# Patient Record
Sex: MALE | Race: WHITE | Employment: OTHER | RURAL
[De-identification: names, ages, dates, MRNs, and addresses within clinical notes are randomized per-mention and may not be internally consistent; named-entity substitution may affect disease eponyms.]

---

## 2021-02-08 ENCOUNTER — VIRTUAL VISIT (OUTPATIENT)
Dept: FAMILY MEDICINE CLINIC | Age: 51
End: 2021-02-08
Payer: COMMERCIAL

## 2021-02-08 DIAGNOSIS — L08.9 FOOT INFECTION: Primary | ICD-10-CM

## 2021-02-08 PROCEDURE — 99204 OFFICE O/P NEW MOD 45 MIN: CPT | Performed by: STUDENT IN AN ORGANIZED HEALTH CARE EDUCATION/TRAINING PROGRAM

## 2021-02-08 RX ORDER — SULFAMETHOXAZOLE AND TRIMETHOPRIM 800; 160 MG/1; MG/1
1 TABLET ORAL 2 TIMES DAILY
Qty: 14 TAB | Refills: 0 | Status: SHIPPED | OUTPATIENT
Start: 2021-02-08 | End: 2021-02-15

## 2021-02-08 NOTE — PROGRESS NOTES
Juan Rivera  44 y.o. male  1970  [address] [place] [address]  [phone]   [address]:    Telemedicine Progress Note  Judith Cloud MD       Encounter Date and Time: February 9, 2021 at 3:21 PM    Consent:  He and/or the health care decision maker is aware that that he may receive a bill for this telephone service, depending on his insurance coverage, and has provided verbal consent to proceed: YES    Chief Complaint   Patient presents with    Wound Infection     History of Present Illness   Juan Rivera is a 44 y.o. male was evaluated by synchronous (real-time) audio-video technology from home, through the [facility] Patient Portal.    Foot concern- 3 weeks ago, started getting blisters on both feet. Had been cleaning and using Neosporin on them. All but one cleared up and now last Wednesday the remaining spot became painful limiting ambulation, some swelling, redness and shooting pain. Now has black spots on it. Has been using epsom salt soaks. Currently has transitioned to wool socks and tennis shoes. SHx: Works for [employer]. 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 HENT: Negative for congestion and sore throat. Respiratory: Negative for cough and shortness of breath. Cardiovascular: Negative for chest pain and palpitations. Gastrointestinal: Negative for nausea and vomiting. Neurological: Negative for dizziness and headaches.        Vitals/Objective:     General: alert, cooperative, no distress   Mental  status: mental status: alert, oriented to person, place, and time, normal mood, behavior, speech, dress, motor activity, and thought processes   Resp: resp: normal effort and no respiratory distress   Neuro: neuro: no gross deficits   Skin: L 2nd toe with small blister and two focal dark area deep to the skin with appearance of small hematomas    Due to this being a TeleHealth evaluation, many elements of the physical examination are unable to be assessed. Assessment and Plan:   Time-based coding, delete if not needed: I spent at least 20 minutes with this new patient, and >50% of the time was spent counseling and/or coordinating care regarding foot wound    Time spent in direct conversation with the patient to include medical condition(s) discussed, assessment and treatment plan:    1. Foot infection- Blister with small resolving hematoma underneath. However given erythema and notable pain will cover empirically for infection. No history of diabetes or PVD. Verbally reviewed labs checked by [facility] for these conditions. Follow up by the end of the week to monitor symptoms and possible in person visit. - trimethoprim-sulfamethoxazole (BACTRIM DS, SEPTRA DS) 160-800 mg per tablet; Take 1 Tab by mouth two (2) times a day for 7 days. Dispense: 14 Tab; Refill: 0        We discussed the expected course, resolution and complications of the diagnosis(es) in detail. Medication risks, benefits, costs, interactions, and alternatives were discussed as indicated. I advised him to contact the office if his condition worsens, changes or fails to improve as anticipated. He expressed understanding with the diagnosis(es) and plan. Patient understands that this encounter was a temporary measure, and the importance of further follow up and examination was emphasized. Patient verbalized understanding. Patient informed to follow up: 3-5 days    Electronically Signed: Omar Art MD    Providers location when delivering service (clinic, hospital, home): Home    CPT Codes 36821-19074 for Established Patients may apply to this Telehealth Visit. POS code: 18.   Modifier GT      Pursuant to the emergency declaration under the [address] [facility], 1135 waiver authority and the [employer] Supplemental Appropriations Act, this Virtual  Visit was conducted, with patient's consent, to reduce the patient's risk of exposure to COVID-19 and provide continuity of care for an established patient. Services were provided through a video synchronous discussion virtually to substitute for in-person clinic visit. History   Patients past medical, surgical and family histories were reviewed and updated.       Past Medical History:   Diagnosis Date    Anxiety     Arthritis     Jo's esophagus     Cough     Depression     Falls     Fatigue     GERD (gastroesophageal reflux disease)     Headache     Hypertension     IBS (irritable bowel syndrome)     N&V (nausea and vomiting)     PTSD (post-traumatic stress disorder)     Rash     Ringing in ears     Snoring     Vertigo     Visual disturbance      Past Surgical History:   Procedure Laterality Date    ABDOMEN SURGERY PROC UNLISTED      HX HEENT       Family History   Problem Relation Age of Onset    Headache Other     MS Other     Stroke Other      Social History     Socioeconomic History    Marital status:      Spouse name: Not on file    Number of children: Not on file    Years of education: Not on file    Highest education level: Not on file   Occupational History    Not on file   Social Needs    Financial resource strain: Not on file    Food insecurity     Worry: Not on file     Inability: Not on file    Transportation needs     Medical: Not on file     Non-medical: Not on file   Tobacco Use    Smoking status: Never Smoker    Smokeless tobacco: Current User   Substance and Sexual Activity    Alcohol use: Yes     Comment: very little    Drug use: No    Sexual activity: Not on file   Lifestyle    Physical activity     Days per week: Not on file     Minutes per session: Not on file    Stress: Not on file   Relationships    Social connections     Talks on phone: Not on file     Gets together: Not on file     Attends Synagogue service: Not on file     Active member of club or organization: Not on file     Attends meetings of clubs or organizations: Not on file     Relationship status: Not on file    Intimate partner violence     Fear of current or ex partner: Not on file     Emotionally abused: Not on file     Physically abused: Not on file     Forced sexual activity: Not on file   Other Topics Concern    Not on file   Social History Narrative    Not on file     Patient Active Problem List   Diagnosis Code    Dizziness and giddiness R42    Visual disturbance H53.9    Memory loss R41.3    B12 deficiency E53.8    Imbalance R26.89    PTSD (post-traumatic stress disorder) F43.10    IBS (irritable bowel syndrome) K58.9    Chronic fatigue R53.82    Vitamin D deficiency E55.9    Pain Disorder Associated With Both Psychological Factors And A History Of A General Medical Condition F45.42    Posttraumatic stress disorder F43.10    Depressive disorder, not elsewhere classified F32.9    Anxiety state, unspecified F41.1          Current Medications/Allergies   Medications and Allergies reviewed:    Current Outpatient Medications   Medication Sig Dispense Refill    trimethoprim-sulfamethoxazole (BACTRIM DS, SEPTRA DS) 160-800 mg per tablet Take 1 Tab by mouth two (2) times a day for 7 days. 14 Tab 0    celecoxib (CELEBREX) 200 mg capsule Take 1 Cap by mouth two (2) times a day. 60 Cap 5    traMADol (ULTRAM) 50 mg tablet Take 1 Tab by mouth two (2) times a day. Max Daily Amount: 100 mg. 30 Tab 0    amitriptyline (ELAVIL) 25 mg tablet Take 1 Tab by mouth nightly. 30 Tab 6    cyclobenzaprine (FLEXERIL) 10 mg tablet Take 1 Tab by mouth three (3) times daily as needed for Muscle Spasm(s). 270 Tab 3    cholecalciferol, vitamin D3, (VITAMIN D3) 2,000 unit tab Take 4,000 mg by mouth.  SUCRALFATE (CARAFATE PO) Take  by mouth three (3) times daily.  esomeprazole (NEXIUM) 40 mg capsule Take  by mouth daily.       Omega-3-DHA-EPA-Fish Oil 1,000 (120-180) mg cap Take  by mouth two (2) times a day.        Allergies   Allergen Reactions    Codeine Rash

## 2023-02-21 ENCOUNTER — OFFICE VISIT (OUTPATIENT)
Dept: FAMILY MEDICINE CLINIC | Age: 53
End: 2023-02-21
Payer: COMMERCIAL

## 2023-02-21 VITALS
HEART RATE: 82 BPM | OXYGEN SATURATION: 98 % | SYSTOLIC BLOOD PRESSURE: 126 MMHG | TEMPERATURE: 97.4 F | DIASTOLIC BLOOD PRESSURE: 71 MMHG | WEIGHT: 203 LBS | BODY MASS INDEX: 27.5 KG/M2 | HEIGHT: 72 IN | RESPIRATION RATE: 18 BRPM

## 2023-02-21 DIAGNOSIS — Z00.00 ENCOUNTER FOR MEDICAL EXAMINATION TO ESTABLISH CARE: Primary | ICD-10-CM

## 2023-02-21 DIAGNOSIS — M25.512 LEFT SHOULDER PAIN, UNSPECIFIED CHRONICITY: ICD-10-CM

## 2023-02-21 PROCEDURE — 99213 OFFICE O/P EST LOW 20 MIN: CPT | Performed by: STUDENT IN AN ORGANIZED HEALTH CARE EDUCATION/TRAINING PROGRAM

## 2023-02-21 RX ORDER — BACLOFEN 20 MG
TABLET ORAL DAILY
COMMUNITY

## 2023-02-21 RX ORDER — DULOXETIN HYDROCHLORIDE 60 MG/1
60 CAPSULE, DELAYED RELEASE ORAL 2 TIMES DAILY
COMMUNITY

## 2023-02-21 RX ORDER — PREGABALIN 150 MG/1
150 CAPSULE ORAL 2 TIMES DAILY
COMMUNITY

## 2023-02-21 RX ORDER — RIZATRIPTAN BENZOATE 10 MG/1
10 TABLET ORAL
COMMUNITY

## 2023-02-21 RX ORDER — TRAZODONE HYDROCHLORIDE 50 MG/1
50 TABLET ORAL
COMMUNITY

## 2023-02-21 RX ORDER — ATORVASTATIN CALCIUM 80 MG/1
40 TABLET, FILM COATED ORAL DAILY
COMMUNITY

## 2023-02-21 RX ORDER — FAMOTIDINE 20 MG/1
20 TABLET, FILM COATED ORAL 2 TIMES DAILY
COMMUNITY

## 2023-02-21 RX ORDER — PANTOPRAZOLE SODIUM 40 MG/1
40 TABLET, DELAYED RELEASE ORAL DAILY
COMMUNITY

## 2023-02-21 RX ORDER — PRAZOSIN HYDROCHLORIDE 2 MG/1
2 CAPSULE ORAL
COMMUNITY

## 2023-02-21 RX ORDER — DICLOFENAC SODIUM 10 MG/G
2 GEL TOPICAL 4 TIMES DAILY
COMMUNITY

## 2023-02-21 RX ORDER — ONDANSETRON HYDROCHLORIDE 8 MG/1
8 TABLET, FILM COATED ORAL
COMMUNITY

## 2023-02-21 NOTE — PROGRESS NOTES
[address] [facility] [address] [facility], [facility] [address]  P ([phone])  Date of visit:  2/21/2023    Chris Gomez is a  45 y.o. male that presents to the clinic to establish care. Followed up with the [place] previously. Follows up with neurology, urology. Retired from [employer] in 2014. Follows up with urology regarding hematuria, will be doing cystoscopy, and renal US for renal cyst. He has been worked up for [address] and all kind of things in the past. He was having symptoms of visual issue, weakness in left/right side of the body, migraine, fatigue. He has medical history of fibromyalgia, PTSD, Migraine. He is on medication to help with all. On tramadol, gets it refilled at the [place]. Left shoulder pain, ongoing for about a month. It is the side were his wife lays on. He is on tramadol. Allergies   Allergies   Allergen Reactions    Codeine Rash       Medications  Current Outpatient Medications   Medication Sig    atorvastatin (LIPITOR) 80 mg tablet Take 40 mg by mouth daily. diclofenac (VOLTAREN) 1 % gel Apply 2 g to affected area four (4) times daily. DULoxetine (CYMBALTA) 60 mg capsule Take 60 mg by mouth two (2) times a day. famotidine (PEPCID) 20 mg tablet Take 20 mg by mouth two (2) times a day.    galcanezumab-gnlm (EMGALITY) 120 mg/mL injection 120 mg by SubCUTAneous route every thirty (30) days. ondansetron hcl (Zofran) 8 mg tablet Take 8 mg by mouth every eight (8) hours as needed for Nausea or Vomiting. pantoprazole (PROTONIX) 40 mg tablet Take 40 mg by mouth daily. pregabalin (Lyrica) 150 mg capsule Take 150 mg by mouth two (2) times a day. traZODone (DESYREL) 50 mg tablet Take 50 mg by mouth nightly. rizatriptan (MAXALT) 10 mg tablet Take 10 mg by mouth once as needed for Migraine. May repeat in 2 hours if needed    ferrous fumarate/vit Bcomp,C (SUPER B COMPLEX PO) Take  by mouth daily. magnesium oxide 500 mg tab Take  by mouth daily. prazosin (MINIPRESS) 2 mg capsule Take 2 mg by mouth nightly. As needed    traMADol (ULTRAM) 50 mg tablet Take 1 Tab by mouth two (2) times a day. Max Daily Amount: 100 mg. (Patient taking differently: Take 100 mg by mouth every eight (8) hours.)    Cholecalciferol, Vitamin D3, 25 mcg (1,000 unit) chew Take 3,000 Units by mouth daily. Omega-3-DHA-EPA-Fish Oil 1,000 (120-180) mg cap Take  by mouth two (2) times a day. celecoxib (CELEBREX) 200 mg capsule Take 1 Cap by mouth two (2) times a day. (Patient not taking: Reported on 2/21/2023)    amitriptyline (ELAVIL) 25 mg tablet Take 1 Tab by mouth nightly. (Patient not taking: Reported on 2/21/2023)    cyclobenzaprine (FLEXERIL) 10 mg tablet Take 1 Tab by mouth three (3) times daily as needed for Muscle Spasm(s). (Patient not taking: Reported on 2/21/2023)    SUCRALFATE (CARAFATE PO) Take  by mouth three (3) times daily. esomeprazole (NEXIUM) 40 mg capsule Take  by mouth daily. (Patient not taking: Reported on 2/21/2023)     No current facility-administered medications for this visit.        Medical History  Past Medical History:   Diagnosis Date    Anxiety     Arthritis     Jo's esophagus     Cough     Depression     Falls     Fatigue     GERD (gastroesophageal reflux disease)     Headache     Hypertension     IBS (irritable bowel syndrome)     N&V (nausea and vomiting)     PTSD (post-traumatic stress disorder)     Rash     Ringing in ears     Snoring     Vertigo     Visual disturbance        Immunizations   Immunization History   Administered Date(s) Administered    Influenza, FLUARIX, FLULAVAL, FLUZONE (age 10 mo+) AND AFLURIA, (age 1 y+), PF, 0.5mL 01/21/2015       Social History  Social History     Tobacco Use    Smoking status: Never    Smokeless tobacco: Current   Substance Use Topics    Alcohol use: Yes     Comment: very little    Drug use: No       Objective   Visit Vitals  /71   Pulse 82   Temp 97.4 °F (36.3 °C) (Oral)   Resp 18   Ht 6' (1.829 m)   Wt 203 lb (92.1 kg)   SpO2 98%   BMI 27.53 kg/m²       Physical Exam  Constitutional:       General: He is not in acute distress. Appearance: Normal appearance. He is not ill-appearing, toxic-appearing or diaphoretic. Cardiovascular:      Rate and Rhythm: Normal rate and regular rhythm. Pulmonary:      Effort: Pulmonary effort is normal. No respiratory distress. Breath sounds: Normal breath sounds. No wheezing. Musculoskeletal:         General: No swelling or deformity. Normal range of motion. Comments: Good range of motion with left and right shoulder. Lift off sign is negative. siddiqi test is negative. Tenderness on the anterior aspect of left upper arm. Neurological:      Mental Status: He is alert. Doris Briceño is a 45 y. o. who presents to the clinic to establish care. Plan     1. Encounter for medical examination to establish care  - Obtain medical records from previous provider ([facility] [place], Urologist and Neurologist). - Follow up in 2-4 weeks to review medical records, to determine lab work or further imaging that needs to be ordered. - Following up with urology for hematuria and kidney cyst.  - Following up with Neurology for migraine. 2. Left shoulder pain, unspecified chronicity: ongoing for a month. Feels like the pain is not with his muscle, but inside. Patient has good range of motion. Low suspicion for impingement. Mickie Wong is negative. May be due to muscle strain. - XR SHOULDER LT AP/LAT MIN 2 V; Future      Follow-up and Dispositions    Return for Follow up in  2- 4 weeks for follow up. I have discussed the aforementioned diagnoses and plan with the patient in detail. I have provided information in person and/or in AVS. All questions answered prior to discharge.     Signed By:  Tiff Lynne MD    Family Medicine Resident

## 2023-02-21 NOTE — PROGRESS NOTES
1. \"Have you been to the ER, urgent care clinic since your last visit? Hospitalized since your last visit? \" No    2. \"Have you seen or consulted any other health care providers outside of the [address] [place] [address] since your last visit? \" No     3. For patients aged 39-70: Has the patient had a colonoscopy / FIT/ Cologuard? Yes - Care Gap present. Rooming MA/LPN to request most recent results      If the patient is female:    4. For patients aged 41-77: Has the patient had a mammogram within the past 2 years? NA - based on age or sex      11. For patients aged 21-65: Has the patient had a pap smear?  NA - based on age or sex    Health Maintenance Due   Topic Date Due    Hepatitis C Screening  Never done    COVID-19 Vaccine (1) Never done    Depression Monitoring  Never done    DTaP/Tdap/Td series (1 - Tdap) Never done    Colorectal Cancer Screening Combo  Never done    Lipid Screen  02/25/2016    Shingles Vaccine (1 of 2) Never done    Flu Vaccine (1) 08/01/2022

## 2023-02-28 ENCOUNTER — PATIENT MESSAGE (OUTPATIENT)
Dept: FAMILY MEDICINE CLINIC | Age: 53
End: 2023-02-28

## 2023-02-28 ENCOUNTER — TELEPHONE (OUTPATIENT)
Dept: FAMILY MEDICINE CLINIC | Age: 53
End: 2023-02-28

## 2023-02-28 DIAGNOSIS — R31.9 HEMATURIA, UNSPECIFIED TYPE: Primary | ICD-10-CM

## 2023-02-28 NOTE — TELEPHONE ENCOUNTER
Pt's wife wrote in requesting pt get a referral to [address] [place] [address] Urology for hematuria. He has been following with the [address] [place] [address] for this, but they are not able to see him for several months. Referral placed and [employer] message sent with referral information.

## 2023-03-01 ENCOUNTER — PATIENT MESSAGE (OUTPATIENT)
Dept: FAMILY MEDICINE CLINIC | Age: 53
End: 2023-03-01

## 2023-03-30 ENCOUNTER — OFFICE VISIT (OUTPATIENT)
Dept: FAMILY MEDICINE CLINIC | Age: 53
End: 2023-03-30
Payer: COMMERCIAL

## 2023-03-30 VITALS
WEIGHT: 203 LBS | HEART RATE: 64 BPM | RESPIRATION RATE: 18 BRPM | DIASTOLIC BLOOD PRESSURE: 69 MMHG | BODY MASS INDEX: 27.5 KG/M2 | OXYGEN SATURATION: 98 % | HEIGHT: 72 IN | TEMPERATURE: 98.2 F | SYSTOLIC BLOOD PRESSURE: 135 MMHG

## 2023-03-30 DIAGNOSIS — R07.81 RIB PAIN ON RIGHT SIDE: Primary | ICD-10-CM

## 2023-03-30 PROCEDURE — 99213 OFFICE O/P EST LOW 20 MIN: CPT | Performed by: FAMILY MEDICINE

## 2023-03-30 RX ORDER — CHOLECALCIFEROL (VITAMIN D3) 125 MCG
5 CAPSULE ORAL
COMMUNITY

## 2023-03-30 RX ORDER — CYCLOBENZAPRINE HCL 5 MG
5 TABLET ORAL
Qty: 10 TABLET | Refills: 0 | Status: SHIPPED | OUTPATIENT
Start: 2023-03-30

## 2023-03-30 NOTE — PROGRESS NOTES
[facility]    History of Present Illness:   Anjelica Cast is a 45 y.o. male here for   Chief Complaint   Patient presents with    Rib Pain     Right side x1.5 weeks  Fall          HPI:  Wanda Leaver  a week and a half ago off of a 3 foot loading dock. He was holding a speaker when he fell. This speaker was pushed into his right lower anterior ribs. No bruising. Some sob initially. No sob now. 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 He says the pain is actually worse now. Pain 7/10 at its worst but it sitting at rest it is 5/10. Pain with reaching, getting up from chair. No tylenol. He takes tramadol and Lyrica which she gets through the [place]. He used to take Flexeril but is not on that currently. He has not tried Voltaren gel yet either. Health Maintenance  Health Maintenance Due   Topic Date Due    Hepatitis C Screening  Never done    COVID-19 Vaccine (1) Never done    DTaP/Tdap/Td series (1 - Tdap) Never done    Colorectal Cancer Screening Combo  Never done    Lipid Screen  02/25/2016    Shingles Vaccine (1 of 2) Never done    Flu Vaccine (1) 08/01/2022       Past Medical, Family, and Social History:     Past Medical History:   Diagnosis Date    Anxiety     Arthritis     Jo's esophagus     Cough     Depression     Falls     Fatigue     GERD (gastroesophageal reflux disease)     Headache     Hypertension     IBS (irritable bowel syndrome)     N&V (nausea and vomiting)     PTSD (post-traumatic stress disorder)     Rash     Ringing in ears     Snoring     Vertigo     Visual disturbance       Past Surgical History:   Procedure Laterality Date    HX HEENT      CT UNLISTED PROCEDURE ABDOMEN PERITONEUM & OMENTUM         Current Outpatient Medications on File Prior to Visit   Medication Sig Dispense Refill    melatonin 5 mg tablet Take 5 mg by mouth nightly. atorvastatin (LIPITOR) 80 mg tablet Take 40 mg by mouth daily.       diclofenac (VOLTAREN) 1 % gel Apply 2 g to affected area four (4) times daily. DULoxetine (CYMBALTA) 60 mg capsule Take 60 mg by mouth two (2) times a day. famotidine (PEPCID) 20 mg tablet Take 20 mg by mouth two (2) times a day.      galcanezumab-gnlm (EMGALITY) 120 mg/mL injection 120 mg by SubCUTAneous route every thirty (30) days. ondansetron hcl (ZOFRAN) 8 mg tablet Take 8 mg by mouth every eight (8) hours as needed for Nausea or Vomiting. pantoprazole (PROTONIX) 40 mg tablet Take 40 mg by mouth daily. pregabalin (LYRICA) 150 mg capsule Take 150 mg by mouth two (2) times a day. traZODone (DESYREL) 50 mg tablet Take 50 mg by mouth nightly. rizatriptan (MAXALT) 10 mg tablet Take 10 mg by mouth once as needed for Migraine. May repeat in 2 hours if needed      ferrous fumarate/vit Bcomp,C (SUPER B COMPLEX PO) Take  by mouth daily. magnesium oxide 500 mg tab Take  by mouth daily. prazosin (MINIPRESS) 2 mg capsule Take 2 mg by mouth nightly. As needed      traMADol (ULTRAM) 50 mg tablet Take 1 Tab by mouth two (2) times a day. Max Daily Amount: 100 mg. (Patient taking differently: Take 100 mg by mouth every eight (8) hours. ) 30 Tab 0    Cholecalciferol, Vitamin D3, 25 mcg (1,000 unit) chew Take 3,000 Units by mouth daily. Omega-3-DHA-EPA-Fish Oil 1,000 (120-180) mg cap Take  by mouth two (2) times a day. celecoxib (CELEBREX) 200 mg capsule Take 1 Cap by mouth two (2) times a day. (Patient not taking: No sig reported) 60 Cap 5    amitriptyline (ELAVIL) 25 mg tablet Take 1 Tab by mouth nightly. (Patient not taking: No sig reported) 30 Tab 6    cyclobenzaprine (FLEXERIL) 10 mg tablet Take 1 Tab by mouth three (3) times daily as needed for Muscle Spasm(s). (Patient not taking: No sig reported) 270 Tab 3    SUCRALFATE (CARAFATE PO) Take  by mouth three (3) times daily. (Patient not taking: Reported on 3/30/2023)      esomeprazole (NEXIUM) 40 mg capsule Take  by mouth daily.  (Patient not taking: No sig reported)       No current facility-administered medications on file prior to visit. Patient Active Problem List   Diagnosis Code    Dizziness and giddiness R42    Visual disturbance H53.9    Memory loss R41.3    B12 deficiency E53.8    Imbalance R26.89    PTSD (post-traumatic stress disorder) F43.10    IBS (irritable bowel syndrome) K58.9    Chronic fatigue R53.82    Vitamin D deficiency E55.9    Pain Disorder Associated With Both Psychological Factors And A History Of A General Medical Condition F45.42    Posttraumatic stress disorder F43.10    Depressive disorder, not elsewhere classified F32.89    Anxiety state, unspecified F41.1       Social History     Socioeconomic History    Marital status:    Tobacco Use    Smoking status: Never    Smokeless tobacco: Current   Substance and Sexual Activity    Alcohol use: Yes     Comment: very little    Drug use: No     Social Determinants of Health     Financial Resource Strain: Low Risk     Difficulty of Paying Living Expenses: Not hard at all   Food Insecurity: No Food Insecurity    Worried About Running Out of Food in the Last Year: Never true    Ran Out of Food in the Last Year: Never true        Review of Systems   Review of Systems   Constitutional:  Negative for chills and fever. Respiratory:  Negative for cough and shortness of breath. Cardiovascular:  Negative for chest pain.      Objective:   Visit Vitals  /69 (BP 1 Location: Left upper arm, BP Patient Position: Sitting, BP Cuff Size: Large adult)   Pulse 64   Temp 98.2 °F (36.8 °C) (Oral)   Resp 18   Ht 6' (1.829 m)   Wt 203 lb (92.1 kg)   SpO2 98%   BMI 27.53 kg/m²        Physical Exam  PHYSICAL EXAM:  Gen: Pt sitting in chair, in NAD  Head: Normocephalic, atraumatic  Eyes: Sclera anicteric, EOM grossly intact,  CVS: Normal S1, S2, no m/r/g  Resp: CTAB, no rhonchi, wheezes or rales   Chest wall: Tender to palpation on the right anterior/lateral rib cage  Abd: Soft, non-tender, non-distended, +BS  Neuro: Alert, oriented, appropriate        Assessment and orders:       ICD-10-CM ICD-9-CM    1. Rib pain on right side  R07.81 786.50 XR RIBS RT UNI 2 V      cyclobenzaprine (FLEXERIL) 5 mg tablet        Diagnoses and all orders for this visit:    1. Rib pain on right side  -     XR RIBS RT UNI 2 V; Future  -     cyclobenzaprine (FLEXERIL) 5 mg tablet; Take 1 Tablet by mouth nightly. Advised otc tylenol arthritis 2 tabs every 12 hr, topical diclofenac, lidocaine patch 12 hr on and 12 hr off. Use Flexeril sparingly and do not take with either tramadol or Lyrica due to risk of seizures, sedation and even coma or death. Patient verbalized understanding and agreed with plan. F/U ASAP for worsening pain or swelling or decreased ROM. radiology results and schedule of future radiology studies reviewed with patient    I have discussed the diagnosis with the patient and the intended plan as seen in the above orders. Social history, medical history, and labs were reviewed. The patient has received an after-visit summary and questions were answered concerning future plans. I have discussed medication side effects and warnings with the patient as well. Patient/guardian verbalized understanding and accepts plan & risks. Please note that this dictation was completed with [employer], the computer voice recognition software. Quite often unanticipated grammatical, syntax, homophones, and other interpretive errors are inadvertently transcribed by the computer software. Please disregard these errors. Please excuse any errors that have escaped final proofreading. Thank you.      MD LIAT Teixeira [facility]  03/30/23

## 2023-03-30 NOTE — PROGRESS NOTES
1. \"Have you been to the ER, urgent care clinic since your last visit? Hospitalized since your last visit? \" No    2. \"Have you seen or consulted any other health care providers outside of the [address] [place] [address] since your last visit? \" No     3. For patients aged 39-70: Has the patient had a colonoscopy / FIT/ Cologuard? Yes - Care Gap present. Rooming MA/LPN to request most recent results      If the patient is female:    4. For patients aged 41-77: Has the patient had a mammogram within the past 2 years? NA - based on age or sex      11. For patients aged 21-65: Has the patient had a pap smear?  NA - based on age or sex    Health Maintenance Due   Topic Date Due    Hepatitis C Screening  Never done    COVID-19 Vaccine (1) Never done    DTaP/Tdap/Td series (1 - Tdap) Never done    Colorectal Cancer Screening Combo  Never done    Lipid Screen  02/25/2016    Shingles Vaccine (1 of 2) Never done    Flu Vaccine (1) 08/01/2022

## 2023-05-22 RX ORDER — FAMOTIDINE 20 MG/1
20 TABLET, FILM COATED ORAL 2 TIMES DAILY
COMMUNITY

## 2023-05-22 RX ORDER — PRAZOSIN HYDROCHLORIDE 2 MG/1
2 CAPSULE ORAL
COMMUNITY

## 2023-05-22 RX ORDER — BACLOFEN 20 MG
TABLET ORAL DAILY
COMMUNITY

## 2023-05-22 RX ORDER — CHOLECALCIFEROL (VITAMIN D3) 125 MCG
5 CAPSULE ORAL NIGHTLY
COMMUNITY

## 2023-05-22 RX ORDER — PREGABALIN 150 MG/1
150 CAPSULE ORAL 2 TIMES DAILY
COMMUNITY

## 2023-05-22 RX ORDER — PANTOPRAZOLE SODIUM 40 MG/1
40 TABLET, DELAYED RELEASE ORAL DAILY
COMMUNITY

## 2023-05-22 RX ORDER — TRAZODONE HYDROCHLORIDE 50 MG/1
50 TABLET ORAL NIGHTLY
COMMUNITY

## 2023-05-22 RX ORDER — ATORVASTATIN CALCIUM 80 MG/1
40 TABLET, FILM COATED ORAL DAILY
COMMUNITY

## 2023-05-22 RX ORDER — RIZATRIPTAN BENZOATE 10 MG/1
10 TABLET ORAL
COMMUNITY

## 2023-05-22 RX ORDER — ONDANSETRON HYDROCHLORIDE 8 MG/1
8 TABLET, FILM COATED ORAL EVERY 8 HOURS PRN
COMMUNITY

## 2023-05-22 RX ORDER — TRAMADOL HYDROCHLORIDE 50 MG/1
50 TABLET ORAL 2 TIMES DAILY
COMMUNITY
Start: 2015-03-10

## 2023-05-22 RX ORDER — DULOXETIN HYDROCHLORIDE 60 MG/1
60 CAPSULE, DELAYED RELEASE ORAL 2 TIMES DAILY
COMMUNITY

## 2023-05-22 RX ORDER — CYCLOBENZAPRINE HCL 5 MG
1 TABLET ORAL NIGHTLY
COMMUNITY
Start: 2023-03-30

## 2023-05-22 RX ORDER — NAPROXEN 500 MG/1
500 TABLET ORAL 2 TIMES DAILY PRN
COMMUNITY
Start: 2023-04-14

## 2024-02-26 LAB
CHOLESTEROL, TOTAL: 152 MG/DL
CHOLESTEROL/HDL RATIO: NORMAL
ESTIMATED AVERAGE GLUCOSE: NORMAL
HBA1C MFR BLD: 5.9 %
HDLC SERPL-MCNC: 43 MG/DL (ref 35–70)
LDL CHOLESTEROL CALCULATED: 71.4 MG/DL (ref 0–160)
NONHDLC SERPL-MCNC: NORMAL MG/DL
TRIGL SERPL-MCNC: 188 MG/DL
VLDLC SERPL CALC-MCNC: NORMAL MG/DL

## 2024-05-21 ENCOUNTER — OFFICE VISIT (OUTPATIENT)
Facility: CLINIC | Age: 54
End: 2024-05-21
Payer: OTHER GOVERNMENT

## 2024-05-21 VITALS
RESPIRATION RATE: 16 BRPM | WEIGHT: 204.6 LBS | HEART RATE: 61 BPM | SYSTOLIC BLOOD PRESSURE: 128 MMHG | HEIGHT: 72 IN | BODY MASS INDEX: 27.71 KG/M2 | DIASTOLIC BLOOD PRESSURE: 73 MMHG | OXYGEN SATURATION: 97 % | TEMPERATURE: 98.1 F

## 2024-05-21 DIAGNOSIS — F51.5 NIGHTMARE: ICD-10-CM

## 2024-05-21 DIAGNOSIS — F33.2 SEVERE EPISODE OF RECURRENT MAJOR DEPRESSIVE DISORDER, WITHOUT PSYCHOTIC FEATURES (HCC): ICD-10-CM

## 2024-05-21 DIAGNOSIS — F43.10 PTSD (POST-TRAUMATIC STRESS DISORDER): ICD-10-CM

## 2024-05-21 DIAGNOSIS — R31.9 HEMATURIA, UNSPECIFIED TYPE: Primary | ICD-10-CM

## 2024-05-21 PROBLEM — N28.1 SIMPLE RENAL CYST: Status: ACTIVE | Noted: 2024-05-21

## 2024-05-21 PROBLEM — E29.1 HYPOGONADISM IN MALE: Status: ACTIVE | Noted: 2024-05-21

## 2024-05-21 PROBLEM — F43.12 CHRONIC POST-TRAUMATIC STRESS DISORDER (PTSD) AFTER MILITARY COMBAT: Status: ACTIVE | Noted: 2024-05-21

## 2024-05-21 PROBLEM — M79.7 PRIMARY FIBROMYALGIA SYNDROME: Status: ACTIVE | Noted: 2024-05-21

## 2024-05-21 PROBLEM — M54.50 CHRONIC LOW BACK PAIN: Status: ACTIVE | Noted: 2024-05-21

## 2024-05-21 PROBLEM — M47.816 SPONDYLOSIS OF LUMBAR SPINE: Status: ACTIVE | Noted: 2024-05-21

## 2024-05-21 PROBLEM — M15.9 GENERALIZED OSTEOARTHRITIS: Status: ACTIVE | Noted: 2024-05-21

## 2024-05-21 PROBLEM — M46.1 SACROILIITIS (HCC): Status: RESOLVED | Noted: 2024-05-21 | Resolved: 2024-05-21

## 2024-05-21 PROBLEM — M17.9 OSTEOARTHRITIS OF KNEE: Status: ACTIVE | Noted: 2024-05-21

## 2024-05-21 PROBLEM — Z77.29 EXPOSURE TO POTENTIALLY HAZARDOUS SUBSTANCE: Status: ACTIVE | Noted: 2024-05-21

## 2024-05-21 PROBLEM — G43.719 CHRONIC MIGRAINE WITHOUT AURA, INTRACTABLE, WITHOUT STATUS MIGRAINOSUS: Status: ACTIVE | Noted: 2024-05-21

## 2024-05-21 PROBLEM — R73.03 PREDIABETES: Status: ACTIVE | Noted: 2024-05-21

## 2024-05-21 PROBLEM — E78.1 HYPERTRIGLYCERIDEMIA: Status: ACTIVE | Noted: 2024-05-21

## 2024-05-21 PROBLEM — M25.50 ARTHRALGIA OF MULTIPLE JOINTS: Status: ACTIVE | Noted: 2024-05-21

## 2024-05-21 PROBLEM — R25.1 TREMOR, UNSPECIFIED: Status: ACTIVE | Noted: 2024-05-21

## 2024-05-21 PROBLEM — M47.819 ARTHRITIS OF SPINE: Status: ACTIVE | Noted: 2024-05-21

## 2024-05-21 PROBLEM — S06.9X9A TRAUMATIC BRAIN INJURY WITH LOSS OF CONSCIOUSNESS (HCC): Status: ACTIVE | Noted: 2024-05-21

## 2024-05-21 PROBLEM — H40.009 BORDERLINE GLAUCOMA: Status: ACTIVE | Noted: 2024-05-21

## 2024-05-21 PROBLEM — H50.10 EXOTROPIA: Status: ACTIVE | Noted: 2024-05-21

## 2024-05-21 PROBLEM — G89.29 CHRONIC LOW BACK PAIN: Status: ACTIVE | Noted: 2024-05-21

## 2024-05-21 PROBLEM — K21.00 GASTRO-ESOPHAGEAL REFLUX DISEASE WITH ESOPHAGITIS: Status: ACTIVE | Noted: 2024-05-21

## 2024-05-21 PROBLEM — M46.1 SACROILIITIS (HCC): Status: ACTIVE | Noted: 2024-05-21

## 2024-05-21 LAB
BILIRUBIN, URINE, POC: NEGATIVE
BLOOD URINE, POC: NEGATIVE
GLUCOSE URINE, POC: NEGATIVE
KETONES, URINE, POC: NEGATIVE
LEUKOCYTE ESTERASE, URINE, POC: NEGATIVE
NITRITE, URINE, POC: NEGATIVE
PH, URINE, POC: 5.5 (ref 4.6–8)
PROTEIN,URINE, POC: NEGATIVE
SPECIFIC GRAVITY, URINE, POC: 1.01 (ref 1–1.03)
URINALYSIS CLARITY, POC: CLEAR
URINALYSIS COLOR, POC: YELLOW
UROBILINOGEN, POC: NORMAL

## 2024-05-21 PROCEDURE — 81003 URINALYSIS AUTO W/O SCOPE: CPT | Performed by: FAMILY MEDICINE

## 2024-05-21 PROCEDURE — 99214 OFFICE O/P EST MOD 30 MIN: CPT | Performed by: FAMILY MEDICINE

## 2024-05-21 RX ORDER — PRAZOSIN HYDROCHLORIDE 2 MG/1
2 CAPSULE ORAL NIGHTLY
Qty: 90 CAPSULE | Refills: 1 | Status: SHIPPED | OUTPATIENT
Start: 2024-05-21 | End: 2024-05-21

## 2024-05-21 RX ORDER — PRAZOSIN HYDROCHLORIDE 2 MG/1
2 CAPSULE ORAL NIGHTLY
Qty: 90 CAPSULE | Refills: 1 | Status: SHIPPED | OUTPATIENT
Start: 2024-05-21

## 2024-05-21 SDOH — ECONOMIC STABILITY: INCOME INSECURITY: HOW HARD IS IT FOR YOU TO PAY FOR THE VERY BASICS LIKE FOOD, HOUSING, MEDICAL CARE, AND HEATING?: NOT HARD AT ALL

## 2024-05-21 SDOH — ECONOMIC STABILITY: FOOD INSECURITY: WITHIN THE PAST 12 MONTHS, THE FOOD YOU BOUGHT JUST DIDN'T LAST AND YOU DIDN'T HAVE MONEY TO GET MORE.: NEVER TRUE

## 2024-05-21 SDOH — ECONOMIC STABILITY: FOOD INSECURITY: WITHIN THE PAST 12 MONTHS, YOU WORRIED THAT YOUR FOOD WOULD RUN OUT BEFORE YOU GOT MONEY TO BUY MORE.: NEVER TRUE

## 2024-05-21 SDOH — ECONOMIC STABILITY: HOUSING INSECURITY
IN THE LAST 12 MONTHS, WAS THERE A TIME WHEN YOU DID NOT HAVE A STEADY PLACE TO SLEEP OR SLEPT IN A SHELTER (INCLUDING NOW)?: NO

## 2024-05-21 ASSESSMENT — PATIENT HEALTH QUESTIONNAIRE - PHQ9
SUM OF ALL RESPONSES TO PHQ QUESTIONS 1-9: 18
8. MOVING OR SPEAKING SO SLOWLY THAT OTHER PEOPLE COULD HAVE NOTICED. OR THE OPPOSITE, BEING SO FIGETY OR RESTLESS THAT YOU HAVE BEEN MOVING AROUND A LOT MORE THAN USUAL: MORE THAN HALF THE DAYS
5. POOR APPETITE OR OVEREATING: MORE THAN HALF THE DAYS
6. FEELING BAD ABOUT YOURSELF - OR THAT YOU ARE A FAILURE OR HAVE LET YOURSELF OR YOUR FAMILY DOWN: NEARLY EVERY DAY
SUM OF ALL RESPONSES TO PHQ QUESTIONS 1-9: 18
1. LITTLE INTEREST OR PLEASURE IN DOING THINGS: MORE THAN HALF THE DAYS
SUM OF ALL RESPONSES TO PHQ QUESTIONS 1-9: 18
SUM OF ALL RESPONSES TO PHQ QUESTIONS 1-9: 17
2. FEELING DOWN, DEPRESSED OR HOPELESS: MORE THAN HALF THE DAYS
7. TROUBLE CONCENTRATING ON THINGS, SUCH AS READING THE NEWSPAPER OR WATCHING TELEVISION: SEVERAL DAYS
3. TROUBLE FALLING OR STAYING ASLEEP: NEARLY EVERY DAY
9. THOUGHTS THAT YOU WOULD BE BETTER OFF DEAD, OR OF HURTING YOURSELF: SEVERAL DAYS
SUM OF ALL RESPONSES TO PHQ9 QUESTIONS 1 & 2: 4
4. FEELING TIRED OR HAVING LITTLE ENERGY: MORE THAN HALF THE DAYS

## 2024-05-21 ASSESSMENT — ANXIETY QUESTIONNAIRES
3. WORRYING TOO MUCH ABOUT DIFFERENT THINGS: NEARLY EVERY DAY
5. BEING SO RESTLESS THAT IT IS HARD TO SIT STILL: MORE THAN HALF THE DAYS
GAD7 TOTAL SCORE: 15
6. BECOMING EASILY ANNOYED OR IRRITABLE: NOT AT ALL
2. NOT BEING ABLE TO STOP OR CONTROL WORRYING: NEARLY EVERY DAY
1. FEELING NERVOUS, ANXIOUS, OR ON EDGE: MORE THAN HALF THE DAYS
4. TROUBLE RELAXING: NEARLY EVERY DAY
7. FEELING AFRAID AS IF SOMETHING AWFUL MIGHT HAPPEN: MORE THAN HALF THE DAYS

## 2024-05-21 ASSESSMENT — COLUMBIA-SUICIDE SEVERITY RATING SCALE - C-SSRS
1. WITHIN THE PAST MONTH, HAVE YOU WISHED YOU WERE DEAD OR WISHED YOU COULD GO TO SLEEP AND NOT WAKE UP?: NO
6. HAVE YOU EVER DONE ANYTHING, STARTED TO DO ANYTHING, OR PREPARED TO DO ANYTHING TO END YOUR LIFE?: NO
2. HAVE YOU ACTUALLY HAD ANY THOUGHTS OF KILLING YOURSELF?: NO

## 2024-05-21 ASSESSMENT — ENCOUNTER SYMPTOMS
COUGH: 0
SHORTNESS OF BREATH: 0

## 2024-05-21 NOTE — PROGRESS NOTES
Chief Complaint   Patient presents with    Hematuria     [facility]          \"Have you been to the ER, urgent care clinic since your last visit?  Hospitalized since your last visit?\"    No     “Have you seen or consulted any other health care providers outside of [facility] since your last visit?”    [facility]     “Have you had a colorectal cancer screening such as a colonoscopy/FIT/Cologuard?    Yes     No colonoscopy on file  No cologuard on file  No FIT/FOBT on file   No flexible sigmoidoscopy on file          Health Maintenance Due   Topic Date Due    Hepatitis B vaccine (1 of 3 - 3-dose series) Never done    COVID-19 Vaccine (1) Never done    Lipids  Never done    HIV screen  Never done    Hepatitis C screen  Never done    DTaP/Tdap/Td vaccine (1 - Tdap) Never done    Diabetes screen  Never done    Colorectal Cancer Screen  Never done    Shingles vaccine (1 of 2) Never done    Depression Monitoring  02/21/2024

## 2024-05-21 NOTE — PROGRESS NOTES
[facility]  Chief Complaint   Patient presents with    Hematuria     CT scans[facility]        History of Present Illness:   Otoniel Myers is a 53 y.o. male       HPI:  Here to f/u hematuria. [facility] - found blood in [facility] 2/22   [facility], [facility]. Has had one CT, renal cysts next month, ultrasound done. He would like second opinion.     He last saw blood in his urine 2 weeks ago. Denies pain with urination but does have pain with ejaculation.   Pink gabriella color.  No etoh. No sodas.  Nonsmoker.  No kidney stones.  Borderline enlarged prostate.  Issues with flow. Not on prazosin presently; using trazodone instead for nightmares.  [facility]. He denies any suicidal intent but does think his family would be better off without him because he 'cannot contribute.'    He takes tramadol and Lyrica which she gets [facility].  He takes Flexeril sparingly.     PMH  Fibromyalgia, migrane, PTSD, chronic pain. Heat helps.      Health Maintenance  Health Maintenance Due   Topic Date Due    A1C test (Diabetic or Prediabetic)  Never done    Lipids  Never done    HIV screen  Never done    Hepatitis C screen  Never done    Polio vaccine (2 of 3 - Adult catch-up series) 10/16/1986    Colorectal Cancer Screen  Never done    COVID-19 Vaccine (3 - 2023-24 season) 09/01/2023    Depression Monitoring  02/21/2024     Patient Active Problem List   Diagnosis    Chronic fatigue    Imbalance    PTSD (post-traumatic stress disorder)    Vitamin D deficiency    B12 deficiency    IBS (irritable bowel syndrome)    Memory loss    Traumatic brain injury with loss of consciousness (HCC)    Arthralgia of multiple joints    Arthritis of spine    Chronic migraine without aura, intractable, without status migrainosus    Chronic post-traumatic stress disorder (PTSD) after  combat    Exotropia    Exposure to potentially hazardous substance

## 2024-05-22 LAB
APPEARANCE UR: CLEAR
BACTERIA SPEC CULT: NORMAL
BACTERIA URNS QL MICRO: NEGATIVE /HPF
BILIRUB UR QL: NEGATIVE
COLOR UR: NORMAL
EPITH CASTS URNS QL MICRO: NORMAL /LPF
GLUCOSE UR STRIP.AUTO-MCNC: NEGATIVE MG/DL
HGB UR QL STRIP: NEGATIVE
HYALINE CASTS URNS QL MICRO: NORMAL /LPF (ref 0–5)
KETONES UR QL STRIP.AUTO: NEGATIVE MG/DL
LEUKOCYTE ESTERASE UR QL STRIP.AUTO: NEGATIVE
NITRITE UR QL STRIP.AUTO: NEGATIVE
PH UR STRIP: 5.5 (ref 5–8)
PROT UR STRIP-MCNC: NEGATIVE MG/DL
RBC #/AREA URNS HPF: NORMAL /HPF (ref 0–5)
SERVICE CMNT-IMP: NORMAL
SP GR UR REFRACTOMETRY: 1.02 (ref 1–1.03)
UROBILINOGEN UR QL STRIP.AUTO: 1 EU/DL (ref 0.2–1)
WBC URNS QL MICRO: NORMAL /HPF (ref 0–4)

## 2024-08-21 ENCOUNTER — OFFICE VISIT (OUTPATIENT)
Facility: CLINIC | Age: 54
End: 2024-08-21
Payer: OTHER GOVERNMENT

## 2024-08-21 VITALS
OXYGEN SATURATION: 99 % | BODY MASS INDEX: 27.5 KG/M2 | DIASTOLIC BLOOD PRESSURE: 77 MMHG | TEMPERATURE: 97.3 F | WEIGHT: 203 LBS | HEART RATE: 55 BPM | HEIGHT: 72 IN | SYSTOLIC BLOOD PRESSURE: 131 MMHG | RESPIRATION RATE: 16 BRPM

## 2024-08-21 DIAGNOSIS — F51.5 NIGHTMARE: ICD-10-CM

## 2024-08-21 PROBLEM — F33.2 MAJOR DEPRESSIVE DISORDER, RECURRENT SEVERE WITHOUT PSYCHOTIC FEATURES (HCC): Status: ACTIVE | Noted: 2024-08-21

## 2024-08-21 PROCEDURE — 99213 OFFICE O/P EST LOW 20 MIN: CPT | Performed by: FAMILY MEDICINE

## 2024-08-21 RX ORDER — PRAZOSIN HYDROCHLORIDE 2 MG/1
2 CAPSULE ORAL NIGHTLY
Qty: 90 CAPSULE | Refills: 1 | Status: SHIPPED | OUTPATIENT
Start: 2024-08-21

## 2024-08-21 ASSESSMENT — ENCOUNTER SYMPTOMS
SHORTNESS OF BREATH: 0
COUGH: 0

## 2024-08-21 NOTE — PROGRESS NOTES
No chief complaint on file.       \"Have you been to the ER, urgent care clinic since your last visit?  Hospitalized since your last visit?\"    NO    “Have you seen or consulted any other health care providers outside of [facility] since your last visit?”    [facility]           Click Here for Release of Records Request     Health Maintenance Due   Topic Date Due    HIV screen  Never done    Hepatitis C screen  Never done    Polio vaccine (2 of 3 - Adult catch-up series) 10/16/1986    COVID-19 Vaccine (3 - 2023-24 season) 09/01/2023    Flu vaccine (1) 08/01/2024

## 2024-08-21 NOTE — PROGRESS NOTES
[facility]  Chief Complaint   Patient presents with    3 Month Follow-Up       History of Present Illness:   Otoniel Myers is a 54 y.o. male       HPI:  Here for f/u. Started prazosin for nightmares and urinary symptoms back in 5/24. Discussed priapism risk with trazodone at length with patient today. [facility]. Given cipro for 3 weeks for prostatitis. F/u next week.  Ct bilateral renal cyst, hepatic steatosis, done 6/24  [facility]s [facility]    Followed by neurology for migranes.     Health Maintenance  Health Maintenance Due   Topic Date Due    HIV screen  Never done    Hepatitis C screen  Never done    Polio vaccine (2 of 3 - Adult catch-up series) 10/16/1986    COVID-19 Vaccine (3 - 2023-24 season) 09/01/2023    Flu vaccine (1) 08/01/2024       Past Medical, Family, and Social History:     Past Medical History:   Diagnosis Date    Anxiety     Arthritis     Graves's esophagus     Chronic pain     [facility] by [facility]    Depression     Falls     GERD (gastroesophageal reflux disease)     Headache     Hyperlipidemia     Hypertension     IBS (irritable bowel syndrome)     diarrhea    Migraine variant     Neuropathy     MALLIKA (obstructive sleep apnea)     Prediabetes     5.9 on 2/29/24    PTSD (post-traumatic stress disorder)     Ringing in ears     Sacroiliitis (HCC) 05/21/2024    Snoring     TBI (traumatic brain injury) (HCC)     Vertigo     Vitamin D deficiency       Past Surgical History:   Procedure Laterality Date    HEENT      [place] UNLISTED PROCEDURE ABDOMEN PERITONEUM & OMENTUM         Current Outpatient Medications on File Prior to Visit   Medication Sig Dispense Refill    Atogepant 60 MG TABS Take 60 mg by mouth      atorvastatin (LIPITOR) 80 MG tablet Take 0.5 tablets by mouth daily      Cholecalciferol 25 MCG (1000 UT) CHEW Take 3,000 Units by mouth daily      cyclobenzaprine (FLEXERIL) 5 MG tablet Take 1 tablet by mouth nightly      diclofenac

## 2024-11-16 ENCOUNTER — APPOINTMENT (OUTPATIENT)
Facility: HOSPITAL | Age: 54
End: 2024-11-16
Payer: OTHER GOVERNMENT

## 2024-11-16 ENCOUNTER — HOSPITAL ENCOUNTER (EMERGENCY)
Facility: HOSPITAL | Age: 54
Discharge: HOME | End: 2024-11-16
Attending: STUDENT IN AN ORGANIZED HEALTH CARE EDUCATION/TRAINING PROGRAM
Payer: OTHER GOVERNMENT

## 2024-11-16 VITALS
TEMPERATURE: 98 F | WEIGHT: 206.79 LBS | RESPIRATION RATE: 14 BRPM | HEIGHT: 72 IN | HEART RATE: 59 BPM | DIASTOLIC BLOOD PRESSURE: 63 MMHG | SYSTOLIC BLOOD PRESSURE: 128 MMHG | BODY MASS INDEX: 28.01 KG/M2 | OXYGEN SATURATION: 97 %

## 2024-11-16 DIAGNOSIS — I86.1 VARICOCELE: Primary | ICD-10-CM

## 2024-11-16 DIAGNOSIS — K40.90 LEFT INGUINAL HERNIA: ICD-10-CM

## 2024-11-16 LAB
ALBUMIN SERPL-MCNC: 3.8 G/DL (ref 3.5–5)
ALBUMIN/GLOB SERPL: 1.2 (ref 1.1–2.2)
ALP SERPL-CCNC: 96 U/L (ref 45–117)
ALT SERPL-CCNC: 45 U/L (ref 12–78)
ANION GAP SERPL CALC-SCNC: 6 MMOL/L (ref 2–12)
APPEARANCE UR: CLEAR
AST SERPL-CCNC: 27 U/L (ref 15–37)
BACTERIA URNS QL MICRO: NEGATIVE /HPF
BASOPHILS # BLD: 0.1 K/UL (ref 0–0.1)
BASOPHILS NFR BLD: 1 % (ref 0–1)
BILIRUB SERPL-MCNC: 1.1 MG/DL (ref 0.2–1)
BILIRUB UR QL: NEGATIVE
BUN SERPL-MCNC: 10 MG/DL (ref 6–20)
BUN/CREAT SERPL: 10 (ref 12–20)
CALCIUM SERPL-MCNC: 8.8 MG/DL (ref 8.5–10.1)
CHLORIDE SERPL-SCNC: 107 MMOL/L (ref 97–108)
CO2 SERPL-SCNC: 27 MMOL/L (ref 21–32)
COLOR UR: NORMAL
CREAT SERPL-MCNC: 1.03 MG/DL (ref 0.7–1.3)
DIFFERENTIAL METHOD BLD: NORMAL
EOSINOPHIL # BLD: 0.3 K/UL (ref 0–0.4)
EOSINOPHIL NFR BLD: 5 % (ref 0–7)
EPITH CASTS URNS QL MICRO: NORMAL /LPF
ERYTHROCYTE [DISTWIDTH] IN BLOOD BY AUTOMATED COUNT: 12.3 % (ref 11.5–14.5)
GLOBULIN SER CALC-MCNC: 3.2 G/DL (ref 2–4)
GLUCOSE SERPL-MCNC: 127 MG/DL (ref 65–100)
GLUCOSE UR STRIP.AUTO-MCNC: NEGATIVE MG/DL
HCT VFR BLD AUTO: 41.9 % (ref 36.6–50.3)
HGB BLD-MCNC: 14.6 G/DL (ref 12.1–17)
HGB UR QL STRIP: NEGATIVE
IMM GRANULOCYTES # BLD AUTO: 0 K/UL (ref 0–0.04)
IMM GRANULOCYTES NFR BLD AUTO: 0 % (ref 0–0.5)
KETONES UR QL STRIP.AUTO: NEGATIVE MG/DL
LEUKOCYTE ESTERASE UR QL STRIP.AUTO: NEGATIVE
LYMPHOCYTES # BLD: 2.8 K/UL (ref 0.8–3.5)
LYMPHOCYTES NFR BLD: 42 % (ref 12–49)
MCH RBC QN AUTO: 31.9 PG (ref 26–34)
MCHC RBC AUTO-ENTMCNC: 34.8 G/DL (ref 30–36.5)
MCV RBC AUTO: 91.7 FL (ref 80–99)
MONOCYTES # BLD: 0.6 K/UL (ref 0–1)
MONOCYTES NFR BLD: 9 % (ref 5–13)
NEUTS SEG # BLD: 2.8 K/UL (ref 1.8–8)
NEUTS SEG NFR BLD: 43 % (ref 32–75)
NITRITE UR QL STRIP.AUTO: NEGATIVE
NRBC # BLD: 0 K/UL (ref 0–0.01)
NRBC BLD-RTO: 0 PER 100 WBC
PH UR STRIP: 6 (ref 5–8)
PLATELET # BLD AUTO: 231 K/UL (ref 150–400)
PMV BLD AUTO: 9.4 FL (ref 8.9–12.9)
POTASSIUM SERPL-SCNC: 3.9 MMOL/L (ref 3.5–5.1)
PROT SERPL-MCNC: 7 G/DL (ref 6.4–8.2)
PROT UR STRIP-MCNC: NEGATIVE MG/DL
RBC # BLD AUTO: 4.57 M/UL (ref 4.1–5.7)
RBC #/AREA URNS HPF: NORMAL /HPF (ref 0–5)
SODIUM SERPL-SCNC: 140 MMOL/L (ref 136–145)
SP GR UR REFRACTOMETRY: 1 (ref 1–1.03)
URINE CULTURE IF INDICATED: NORMAL
UROBILINOGEN UR QL STRIP.AUTO: 0.2 EU/DL (ref 0.2–1)
WBC # BLD AUTO: 6.6 K/UL (ref 4.1–11.1)
WBC URNS QL MICRO: NORMAL /HPF (ref 0–4)

## 2024-11-16 PROCEDURE — 85025 COMPLETE CBC W/AUTO DIFF WBC: CPT

## 2024-11-16 PROCEDURE — 76870 US EXAM SCROTUM: CPT

## 2024-11-16 PROCEDURE — 80053 COMPREHEN METABOLIC PANEL: CPT

## 2024-11-16 PROCEDURE — 36415 COLL VENOUS BLD VENIPUNCTURE: CPT

## 2024-11-16 PROCEDURE — 99284 EMERGENCY DEPT VISIT MOD MDM: CPT

## 2024-11-16 PROCEDURE — 81001 URINALYSIS AUTO W/SCOPE: CPT

## 2024-11-16 ASSESSMENT — LIFESTYLE VARIABLES
HOW MANY STANDARD DRINKS CONTAINING ALCOHOL DO YOU HAVE ON A TYPICAL DAY: PATIENT DOES NOT DRINK
HOW OFTEN DO YOU HAVE A DRINK CONTAINING ALCOHOL: NEVER

## 2024-11-16 ASSESSMENT — PAIN SCALES - GENERAL: PAINLEVEL_OUTOF10: 6

## 2024-11-16 ASSESSMENT — PAIN DESCRIPTION - LOCATION: LOCATION: SCROTUM

## 2024-11-16 ASSESSMENT — PAIN - FUNCTIONAL ASSESSMENT: PAIN_FUNCTIONAL_ASSESSMENT: 0-10

## 2024-11-17 NOTE — ED PROVIDER NOTES
Procedure Laterality Date    HEENT      [place] UNLISTED PROCEDURE ABDOMEN PERITONEUM & OMENTUM           CURRENT MEDICATIONS       Discharge Medication List as of 11/16/2024 11:42 PM        CONTINUE these medications which have NOT CHANGED    Details   Atogepant 60 MG TABS Take 60 mg by mouthHistorical Med      prazosin (MINIPRESS) 2 MG capsule Take 1 capsule by mouth nightly, Disp-90 capsule, R-1Normal      atorvastatin (LIPITOR) 80 MG tablet Take 0.5 tablets by mouth dailyHistorical Med      Cholecalciferol 25 MCG (1000 UT) CHEW Take 3,000 Units by mouth dailyHistorical Med      cyclobenzaprine (FLEXERIL) 5 MG tablet Take 1 tablet by mouth nightlyHistorical Med      diclofenac sodium (VOLTAREN) 1 % GEL Apply 2 g topically 4 times daily, Topical, 4 TIMES DAILY, Historical Med      DULoxetine (CYMBALTA) 60 MG extended release capsule Take 1 capsule by mouth 2 times dailyHistorical Med      famotidine (PEPCID) 20 MG tablet Take 1 tablet by mouth 2 times dailyHistorical Med      Galcanezumab-gnlm 120 MG/ML SOAJ Inject 120 mg into the skin every 30 daysHistorical Med      magnesium Oxide 500 MG TABS Take by mouth dailyHistorical Med      melatonin 5 MG TABS tablet Take 1 tablet by mouth nightlyHistorical Med      ondansetron (ZOFRAN) 8 MG tablet Take 1 tablet by mouth every 8 hours as neededHistorical Med      pantoprazole (PROTONIX) 40 MG tablet Take 1 tablet by mouth dailyHistorical Med      pregabalin (LYRICA) 150 MG capsule Take 1 capsule by mouth 2 times daily.Historical Med      rizatriptan (MAXALT) 10 MG tablet Take 1 tablet by mouth once as neededHistorical Med      traMADol (ULTRAM) 50 MG tablet Take 1 tablet by mouth 2 times daily. 2 pills am, 2 pills pmHistorical Med      traZODone (DESYREL) 50 MG tablet Take 1 tablet by mouth nightlyHistorical Med             ALLERGIES     Omeprazole, Codeine, and Gabapentin    FAMILY HISTORY       Family History   Problem Relation Age of Onset    Headache Other     Mult

## 2024-11-17 NOTE — ED TRIAGE NOTES
Per Patient, patient states that they have been having testicular pain on the left side with left side abdominal pain that started this morning. Patient states that their wife felt the area and felt \"granular\" in the scrotum sac.      (-) CP, SOB, numbness/tingling, N/V,    (+) migraine for awhile has a hx with visual changes.

## 2024-11-17 NOTE — DISCHARGE INSTRUCTIONS
The ultrasound today showed a borderline left varicocele as well as a subtle left inguinal hernia.  We would like for you to follow-up with your urologist given your visit today here as well as general surgery for evaluation of your inguinal hernia.  If symptoms worsen or new concerning symptoms arise, please report to nearest emergency department.    Thank you for allowing us to provide you with medical care today.  We realize that you have many choices for your emergency care needs.  We thank you for choosing [facility].  Please choose us in the future for any continued health care needs.     The exam and treatment you received in the Emergency Department were for an emergent problem and are not intended as complete care. It is important that you follow up with a doctor, nurse practitioner, or physician assistant for ongoing care. If your symptoms worsen or you do not improve as expected and you are unable to reach your usual health care provider, you should return to the Emergency Department.  We are available 24 hours a day.     Please make an appointment with your health care provider(s) for follow up of your Emergency Department visit.  Take this sheet with you when you go to your follow-up visit.

## 2025-02-17 SDOH — ECONOMIC STABILITY: FOOD INSECURITY: WITHIN THE PAST 12 MONTHS, THE FOOD YOU BOUGHT JUST DIDN'T LAST AND YOU DIDN'T HAVE MONEY TO GET MORE.: NEVER TRUE

## 2025-02-17 SDOH — ECONOMIC STABILITY: TRANSPORTATION INSECURITY
IN THE PAST 12 MONTHS, HAS THE LACK OF TRANSPORTATION KEPT YOU FROM MEDICAL APPOINTMENTS OR FROM GETTING MEDICATIONS?: NO

## 2025-02-17 SDOH — ECONOMIC STABILITY: FOOD INSECURITY: WITHIN THE PAST 12 MONTHS, YOU WORRIED THAT YOUR FOOD WOULD RUN OUT BEFORE YOU GOT MONEY TO BUY MORE.: NEVER TRUE

## 2025-02-17 SDOH — ECONOMIC STABILITY: TRANSPORTATION INSECURITY
IN THE PAST 12 MONTHS, HAS LACK OF TRANSPORTATION KEPT YOU FROM MEETINGS, WORK, OR FROM GETTING THINGS NEEDED FOR DAILY LIVING?: NO

## 2025-02-17 SDOH — ECONOMIC STABILITY: INCOME INSECURITY: IN THE LAST 12 MONTHS, WAS THERE A TIME WHEN YOU WERE NOT ABLE TO PAY THE MORTGAGE OR RENT ON TIME?: NO

## 2025-02-18 ENCOUNTER — OFFICE VISIT (OUTPATIENT)
Facility: CLINIC | Age: 55
End: 2025-02-18
Payer: OTHER GOVERNMENT

## 2025-02-18 VITALS
TEMPERATURE: 97.6 F | RESPIRATION RATE: 18 BRPM | DIASTOLIC BLOOD PRESSURE: 61 MMHG | BODY MASS INDEX: 27.28 KG/M2 | HEIGHT: 72 IN | WEIGHT: 201.4 LBS | SYSTOLIC BLOOD PRESSURE: 99 MMHG | OXYGEN SATURATION: 99 % | HEART RATE: 57 BPM

## 2025-02-18 DIAGNOSIS — F51.5 NIGHTMARE: ICD-10-CM

## 2025-02-18 PROBLEM — Z77.29 EXPOSURE TO POTENTIALLY HAZARDOUS SUBSTANCE: Status: RESOLVED | Noted: 2024-05-21 | Resolved: 2025-02-18

## 2025-02-18 PROCEDURE — 99213 OFFICE O/P EST LOW 20 MIN: CPT | Performed by: FAMILY MEDICINE

## 2025-02-18 RX ORDER — PRAZOSIN HYDROCHLORIDE 2 MG/1
2 CAPSULE ORAL NIGHTLY
Qty: 90 CAPSULE | Refills: 1 | Status: SHIPPED | OUTPATIENT
Start: 2025-02-18

## 2025-02-18 ASSESSMENT — PATIENT HEALTH QUESTIONNAIRE - PHQ9
10. IF YOU CHECKED OFF ANY PROBLEMS, HOW DIFFICULT HAVE THESE PROBLEMS MADE IT FOR YOU TO DO YOUR WORK, TAKE CARE OF THINGS AT HOME, OR GET ALONG WITH OTHER PEOPLE: SOMEWHAT DIFFICULT
1. LITTLE INTEREST OR PLEASURE IN DOING THINGS: NOT AT ALL
SUM OF ALL RESPONSES TO PHQ9 QUESTIONS 1 & 2: 2
SUM OF ALL RESPONSES TO PHQ QUESTIONS 1-9: 10
6. FEELING BAD ABOUT YOURSELF - OR THAT YOU ARE A FAILURE OR HAVE LET YOURSELF OR YOUR FAMILY DOWN: MORE THAN HALF THE DAYS
8. MOVING OR SPEAKING SO SLOWLY THAT OTHER PEOPLE COULD HAVE NOTICED. OR THE OPPOSITE, BEING SO FIGETY OR RESTLESS THAT YOU HAVE BEEN MOVING AROUND A LOT MORE THAN USUAL: NOT AT ALL
2. FEELING DOWN, DEPRESSED OR HOPELESS: MORE THAN HALF THE DAYS
4. FEELING TIRED OR HAVING LITTLE ENERGY: MORE THAN HALF THE DAYS
5. POOR APPETITE OR OVEREATING: MORE THAN HALF THE DAYS
7. TROUBLE CONCENTRATING ON THINGS, SUCH AS READING THE NEWSPAPER OR WATCHING TELEVISION: MORE THAN HALF THE DAYS
SUM OF ALL RESPONSES TO PHQ QUESTIONS 1-9: 10
3. TROUBLE FALLING OR STAYING ASLEEP: NOT AT ALL
SUM OF ALL RESPONSES TO PHQ QUESTIONS 1-9: 10
SUM OF ALL RESPONSES TO PHQ QUESTIONS 1-9: 10
9. THOUGHTS THAT YOU WOULD BE BETTER OFF DEAD, OR OF HURTING YOURSELF: NOT AT ALL

## 2025-02-18 ASSESSMENT — ENCOUNTER SYMPTOMS
BLOOD IN STOOL: 0
SHORTNESS OF BREATH: 0
COUGH: 0

## 2025-02-18 NOTE — PROGRESS NOTES
[facility]  Chief Complaint   Patient presents with    6 Month Follow-Up       History of Present Illness:   Otoniel Myers is a 54 y.o. male       HPI:  Here for f/u prazosin for nightmares and urinary symptoms. [facility]. Ct bilateral renal cyst, hepatic steatosis, done 6/24  [facility] advised [facility]/s [facility]     Followed by neurology for migranes. [facility].    A1c 2/26/24 5.9, psa normal    Had flu, shingles shot.     Health Maintenance  Health Maintenance Due   Topic Date Due    HIV screen  Never done    Hepatitis C screen  Never done    Polio vaccine (2 of 3 - Adult catch-up series) 10/16/1986    Pneumococcal 50+ years Vaccine (1 of 1 - PCV) Never done    COVID-19 Vaccine (3 - 2024-25 season) 09/01/2024    A1C test (Diabetic or Prediabetic)  02/26/2025    Lipids  02/26/2025       Past Medical, Family, and Social History:     Past Medical History:   Diagnosis Date    Anxiety     Arthritis     Graves's esophagus     Chronic back pain     Chronic pain     [facility]    Depression     Exposure to potentially hazardous substance 05/21/2024 Apr 20, 2024 Entered By: JONEL SHAIKH Comment: Entered automatically through [facility] Problem List documentation program      Falls     Fibromyalgia     GERD (gastroesophageal reflux disease)     Headache     Hyperlipidemia     Hypertension     IBS (irritable bowel syndrome)     diarrhea    Imbalance 10/30/2014    Migraine variant     Neuropathy     MALLIKA (obstructive sleep apnea)     Osteoarthritis     Prediabetes     5.9 on 2/29/24    PTSD (post-traumatic stress disorder)     Ringing in ears     Sacroiliitis (HCC) 05/21/2024    Snoring     TBI (traumatic brain injury)     Vertigo     Vitamin D deficiency       Past Surgical History:   Procedure Laterality Date    HEENT      HERNIA REPAIR      [place] UNLISTED PROCEDURE ABDOMEN PERITONEUM & OMENTUM      TONSILLECTOMY         Current Outpatient Medications on

## 2025-08-18 ENCOUNTER — OFFICE VISIT (OUTPATIENT)
Facility: CLINIC | Age: 55
End: 2025-08-18
Payer: COMMERCIAL

## 2025-08-18 VITALS
HEART RATE: 62 BPM | BODY MASS INDEX: 27.36 KG/M2 | SYSTOLIC BLOOD PRESSURE: 100 MMHG | TEMPERATURE: 98.2 F | WEIGHT: 202 LBS | DIASTOLIC BLOOD PRESSURE: 64 MMHG | RESPIRATION RATE: 14 BRPM | HEIGHT: 72 IN | OXYGEN SATURATION: 100 %

## 2025-08-18 DIAGNOSIS — E78.2 MIXED HYPERLIPIDEMIA: ICD-10-CM

## 2025-08-18 DIAGNOSIS — F51.5 NIGHTMARE: ICD-10-CM

## 2025-08-18 DIAGNOSIS — S06.9X9S TRAUMATIC BRAIN INJURY WITH LOSS OF CONSCIOUSNESS, SEQUELA: ICD-10-CM

## 2025-08-18 DIAGNOSIS — F33.2 SEVERE EPISODE OF RECURRENT MAJOR DEPRESSIVE DISORDER, WITHOUT PSYCHOTIC FEATURES (HCC): ICD-10-CM

## 2025-08-18 DIAGNOSIS — G43.719 CHRONIC MIGRAINE WITHOUT AURA, INTRACTABLE, WITHOUT STATUS MIGRAINOSUS: ICD-10-CM

## 2025-08-18 DIAGNOSIS — Z79.899 LONG TERM USE OF DRUG: ICD-10-CM

## 2025-08-18 DIAGNOSIS — R73.03 PREDIABETES: Primary | ICD-10-CM

## 2025-08-18 DIAGNOSIS — N28.1 SIMPLE RENAL CYST: ICD-10-CM

## 2025-08-18 DIAGNOSIS — M54.50 CHRONIC BILATERAL LOW BACK PAIN, UNSPECIFIED WHETHER SCIATICA PRESENT: ICD-10-CM

## 2025-08-18 DIAGNOSIS — G89.29 CHRONIC BILATERAL LOW BACK PAIN, UNSPECIFIED WHETHER SCIATICA PRESENT: ICD-10-CM

## 2025-08-18 PROBLEM — E78.1: Status: ACTIVE | Noted: 2025-08-18

## 2025-08-18 PROBLEM — G47.30 SLEEP APNEA, UNSPECIFIED: Status: ACTIVE | Noted: 2025-08-18

## 2025-08-18 PROBLEM — E78.1: Status: RESOLVED | Noted: 2025-08-18 | Resolved: 2025-08-18

## 2025-08-18 PROCEDURE — 99214 OFFICE O/P EST MOD 30 MIN: CPT | Performed by: FAMILY MEDICINE

## 2025-08-18 RX ORDER — PNEUMOCOCCAL 20-VALENT CONJUGATE VACCINE 2.2; 2.2; 2.2; 2.2; 2.2; 2.2; 2.2; 2.2; 2.2; 2.2; 2.2; 2.2; 2.2; 2.2; 2.2; 2.2; 4.4; 2.2; 2.2; 2.2 UG/.5ML; UG/.5ML; UG/.5ML; UG/.5ML; UG/.5ML; UG/.5ML; UG/.5ML; UG/.5ML; UG/.5ML; UG/.5ML; UG/.5ML; UG/.5ML; UG/.5ML; UG/.5ML; UG/.5ML; UG/.5ML; UG/.5ML; UG/.5ML; UG/.5ML; UG/.5ML
0.5 INJECTION, SUSPENSION INTRAMUSCULAR ONCE
Qty: 1 EACH | Refills: 0 | Status: SHIPPED | OUTPATIENT
Start: 2025-08-18 | End: 2025-08-18

## 2025-08-18 ASSESSMENT — ENCOUNTER SYMPTOMS
ABDOMINAL PAIN: 0
BACK PAIN: 1
SHORTNESS OF BREATH: 0

## 2025-08-19 ENCOUNTER — RESULTS FOLLOW-UP (OUTPATIENT)
Facility: CLINIC | Age: 55
End: 2025-08-19

## 2025-08-19 LAB
ALBUMIN SERPL-MCNC: 4.1 G/DL (ref 3.5–5.2)
ALBUMIN/GLOB SERPL: 1.6 (ref 1.1–2.2)
ALP SERPL-CCNC: 100 U/L (ref 40–129)
ALT SERPL-CCNC: 34 U/L (ref 10–50)
ANION GAP SERPL CALC-SCNC: 11 MMOL/L (ref 2–14)
AST SERPL-CCNC: 29 U/L (ref 10–50)
BACTERIA URNS QL MICRO: NEGATIVE /HPF
BASOPHILS # BLD: 0.07 K/UL (ref 0–0.1)
BASOPHILS NFR BLD: 1.3 % (ref 0–1)
BILIRUB SERPL-MCNC: 0.4 MG/DL (ref 0–1.2)
BUN SERPL-MCNC: 11 MG/DL (ref 6–20)
BUN/CREAT SERPL: 11 (ref 12–20)
CALCIUM SERPL-MCNC: 9 MG/DL (ref 8.6–10)
CHLORIDE SERPL-SCNC: 105 MMOL/L (ref 98–107)
CHOLEST SERPL-MCNC: 153 MG/DL (ref 0–200)
CO2 SERPL-SCNC: 23 MMOL/L (ref 20–29)
CREAT SERPL-MCNC: 1.03 MG/DL (ref 0.7–1.2)
DIFFERENTIAL METHOD BLD: ABNORMAL
EOSINOPHIL # BLD: 0.24 K/UL (ref 0–0.4)
EOSINOPHIL NFR BLD: 4.3 % (ref 0–7)
EPITH CASTS URNS QL MICRO: NORMAL /LPF
ERYTHROCYTE [DISTWIDTH] IN BLOOD BY AUTOMATED COUNT: 12.2 % (ref 11.5–14.5)
EST. AVERAGE GLUCOSE BLD GHB EST-MCNC: 119 MG/DL
GLOBULIN SER CALC-MCNC: 2.5 G/DL (ref 2–4)
GLUCOSE SERPL-MCNC: 112 MG/DL (ref 65–100)
HBA1C MFR BLD: 5.8 % (ref 4–5.6)
HCT VFR BLD AUTO: 44.8 % (ref 36.6–50.3)
HDLC SERPL-MCNC: 39 MG/DL (ref 40–60)
HDLC SERPL: 3.9 (ref 0–5)
HGB BLD-MCNC: 14.8 G/DL (ref 12.1–17)
IMM GRANULOCYTES # BLD AUTO: 0.02 K/UL (ref 0–0.04)
IMM GRANULOCYTES NFR BLD AUTO: 0.4 % (ref 0–0.5)
LDLC SERPL CALC-MCNC: 63 MG/DL
LYMPHOCYTES # BLD: 1.64 K/UL (ref 0.8–3.5)
LYMPHOCYTES NFR BLD: 29.7 % (ref 12–49)
MCH RBC QN AUTO: 31.8 PG (ref 26–34)
MCHC RBC AUTO-ENTMCNC: 33 G/DL (ref 30–36.5)
MCV RBC AUTO: 96.1 FL (ref 80–99)
MONOCYTES # BLD: 0.41 K/UL (ref 0–1)
MONOCYTES NFR BLD: 7.4 % (ref 5–13)
NEUTS SEG # BLD: 3.14 K/UL (ref 1.8–8)
NEUTS SEG NFR BLD: 56.9 % (ref 32–75)
NRBC # BLD: 0 K/UL (ref 0–0.01)
NRBC BLD-RTO: 0 PER 100 WBC
OTHER: NORMAL
PLATELET # BLD AUTO: 252 K/UL (ref 150–400)
PMV BLD AUTO: 10 FL (ref 8.9–12.9)
POTASSIUM SERPL-SCNC: 4.5 MMOL/L (ref 3.5–5.1)
PROT SERPL-MCNC: 6.6 G/DL (ref 6.4–8.3)
RBC # BLD AUTO: 4.66 M/UL (ref 4.1–5.7)
RBC #/AREA URNS HPF: NORMAL /HPF (ref 0–5)
SODIUM SERPL-SCNC: 139 MMOL/L (ref 136–145)
SPERM URNS QL MICRO: PRESENT
TRIGL SERPL-MCNC: 255 MG/DL (ref 0–150)
VLDLC SERPL CALC-MCNC: 51 MG/DL
WBC # BLD AUTO: 5.5 K/UL (ref 4.1–11.1)
WBC URNS QL MICRO: NORMAL /HPF (ref 0–4)